# Patient Record
Sex: MALE | Race: BLACK OR AFRICAN AMERICAN | URBAN - METROPOLITAN AREA
[De-identification: names, ages, dates, MRNs, and addresses within clinical notes are randomized per-mention and may not be internally consistent; named-entity substitution may affect disease eponyms.]

---
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
## 2023-07-27 NOTE — PROGRESS NOTES
In lab sleep study completed at [facility] on 7/25/2023  Subclinical TANIA documented.  AHI-2.8   No significant snoring.    Recommendation: not a candidate for treatment but if significant unexplained daytime sleepiness or any suggestion of narcolepsy consider MSLT protocol. Full report to follow       Subjective:    HPI                    Objective:    System    Physical Exam    General     ROS    Assessment/Plan:      DISCHARGE REPORT    Progress reporting period is from 4/19/17 to 5/2/17 (3 visits).       SUBJECTIVE  Subjective changes noted by patient:  Patient reports he feels his knees are a little better than last week.  He had kickball in [facility] ed today at school, he didn't play that much due to still having some knee pain.     Current Pain level: 2/10.     Initial Pain level: 8/10.   Changes in function:  Yes (See Goal flowsheet attached for changes in current functional level)  Adverse reaction to treatment or activity: None    OBJECTIVE  Changes noted in objective findings:  Yes,   Objective: Advanced to green band with side stepping (some additional trunk sway), and knee bends---still good control to avoid valgus).  Picking up strength during PT exercises.      ASSESSMENT/PLAN  Updated problem list and treatment plan: Diagnosis 1:  B knee pain, low grade MCL sprain possible, very weak gluteals   Progress toward STG/LTGs have been made:  See Goal flow sheet completed today.  Assessment of Progress: Patient did not return to therapy. Current status is unknown.  Self Management Plans:  Patient has been instructed in a home treatment program.  Patient continues to require the following intervention to meet STG and LT's:  Patient did not return to therapy.  PT services will be discontinued.     Recommendations:  Patient will be discharged from physical therapy.         Please refer to the daily flowsheet for treatment today, total treatment time and time spent performing 1:1 timed codes.